# Patient Record
Sex: FEMALE | Race: BLACK OR AFRICAN AMERICAN | URBAN - METROPOLITAN AREA
[De-identification: names, ages, dates, MRNs, and addresses within clinical notes are randomized per-mention and may not be internally consistent; named-entity substitution may affect disease eponyms.]

---

## 2017-02-22 ENCOUNTER — OFFICE (AMBULATORY)
Dept: URBAN - METROPOLITAN AREA CLINIC 64 | Facility: CLINIC | Age: 47
End: 2017-02-22
Payer: COMMERCIAL

## 2017-02-22 VITALS
HEART RATE: 54 BPM | SYSTOLIC BLOOD PRESSURE: 128 MMHG | HEIGHT: 64 IN | WEIGHT: 278 LBS | DIASTOLIC BLOOD PRESSURE: 100 MMHG

## 2017-02-22 DIAGNOSIS — D50.0 IRON DEFICIENCY ANEMIA SECONDARY TO BLOOD LOSS (CHRONIC): ICD-10-CM

## 2017-02-22 DIAGNOSIS — B16.9 ACUTE HEPATITIS B WITHOUT DELTA-AGENT AND WITHOUT HEPATIC CO: ICD-10-CM

## 2017-02-22 LAB
HBSAG SCREEN: POSITIVE
HBV CORE AB, IGG/IGM DIFF: HEP B CORE AB, IGM: NEGATIVE
HBV CORE AB, IGG/IGM DIFF: HEP B CORE AB, TOT: POSITIVE
HBV QUANT PCR RFX TO GENOTYPE: HBV GENOTYPE: (no result)
HBV QUANT PCR RFX TO GENOTYPE: HBV IU/ML: 300 IU/ML
HBV QUANT PCR RFX TO GENOTYPE: LOG10 HBV AS IU/ML: 2.48 LOG10IU/ML
HBV QUANT PCR RFX TO GENOTYPE: TEST INFORMATION: (no result)
HCV ANTIBODY: HEP C VIRUS AB: <0.1 S/CO RATIO
HEP A AB, IGM: NEGATIVE
HEP A AB, TOTAL: NEGATIVE
HEP BE AB: POSITIVE
HEP BE AG: NEGATIVE
HEPATITIS B SURF AB QUANT: <3.1 MIU/ML — LOW

## 2017-02-22 PROCEDURE — 99243 OFF/OP CNSLTJ NEW/EST LOW 30: CPT | Performed by: INTERNAL MEDICINE

## 2017-02-22 RX ORDER — FERROUS SULFATE TAB EC 325 MG (65 MG FE EQUIVALENT) 325 (65 FE) MG
325 TABLET DELAYED RESPONSE ORAL
Qty: 30 | Refills: 1 | Status: COMPLETED
Start: 2017-02-22 | End: 2017-10-18

## 2017-03-01 ENCOUNTER — HOSPITAL ENCOUNTER (OUTPATIENT)
Dept: LAB | Facility: HOSPITAL | Age: 47
Discharge: HOME | End: 2017-03-01
Attending: INTERNAL MEDICINE | Admitting: INTERNAL MEDICINE

## 2017-03-09 LAB — FIBROSPECT: NORMAL

## 2017-10-18 ENCOUNTER — OFFICE (AMBULATORY)
Dept: URBAN - METROPOLITAN AREA CLINIC 64 | Facility: CLINIC | Age: 47
End: 2017-10-18
Payer: COMMERCIAL

## 2017-10-18 VITALS
SYSTOLIC BLOOD PRESSURE: 106 MMHG | WEIGHT: 283 LBS | HEIGHT: 64 IN | HEART RATE: 91 BPM | DIASTOLIC BLOOD PRESSURE: 63 MMHG

## 2017-10-18 DIAGNOSIS — B18.1 CHRONIC VIRAL HEPATITIS B WITHOUT DELTA-AGENT: ICD-10-CM

## 2017-10-18 DIAGNOSIS — D50.0 IRON DEFICIENCY ANEMIA SECONDARY TO BLOOD LOSS (CHRONIC): ICD-10-CM

## 2017-10-18 PROCEDURE — 99212 OFFICE O/P EST SF 10 MIN: CPT | Performed by: INTERNAL MEDICINE

## 2018-05-02 ENCOUNTER — ON CAMPUS - OUTPATIENT (AMBULATORY)
Dept: URBAN - METROPOLITAN AREA HOSPITAL 77 | Facility: HOSPITAL | Age: 48
End: 2018-05-02

## 2018-05-02 DIAGNOSIS — K21.9 GASTRO-ESOPHAGEAL REFLUX DISEASE WITHOUT ESOPHAGITIS: ICD-10-CM

## 2018-05-02 DIAGNOSIS — F45.8 OTHER SOMATOFORM DISORDERS: ICD-10-CM

## 2018-05-02 DIAGNOSIS — R13.10 DYSPHAGIA, UNSPECIFIED: ICD-10-CM

## 2018-05-02 PROCEDURE — 43235 EGD DIAGNOSTIC BRUSH WASH: CPT | Performed by: INTERNAL MEDICINE

## 2018-05-02 PROCEDURE — 43450 DILATE ESOPHAGUS 1/MULT PASS: CPT | Performed by: INTERNAL MEDICINE

## 2020-04-10 ENCOUNTER — TELEHEALTH PROVIDED OTHER THAN IN PATIENT'S HOME (AMBULATORY)
Dept: URBAN - METROPOLITAN AREA TELEHEALTH 4 | Facility: TELEHEALTH | Age: 50
End: 2020-04-10
Payer: COMMERCIAL

## 2020-04-10 VITALS — HEIGHT: 64 IN

## 2020-04-10 DIAGNOSIS — D50.0 IRON DEFICIENCY ANEMIA SECONDARY TO BLOOD LOSS (CHRONIC): ICD-10-CM

## 2020-04-10 DIAGNOSIS — B18.1 CHRONIC VIRAL HEPATITIS B WITHOUT DELTA-AGENT: ICD-10-CM

## 2020-04-10 PROCEDURE — 99441 VIRTUAL CHECK-IN 5-10 MIN; COMMERCIAL INS: CPT | Performed by: INTERNAL MEDICINE

## 2020-04-10 NOTE — SERVICEHPINOTES
Today,   APRIL  LAZ   is a   49  female   participating in a virtual check-in via phone for follow-up evaluation of chronic hepatitis B.  We last saw her a couple of years ago for evaluation and treatment of chronic active hepatitis B in this setting of lung surgery for lung cancer and the need for chemotherapy.  She was treated with Baraclude prior to, during, and after hemotherapy in order to avoid a hepatitis B flare.  Today she reports that she is no longer taking Baraclude.  She reports that her CT scans have not shown any recurrent lung cancer thus far and she is no longer on chemotherapy.She does report that her liver enzymes temporarily went up which was the initial reason her primary care physician refer her back to GI.  Review of her records that I have show normal liver enzymes in October 2019.  She is not currently complaining of any nausea, jaundice, tea colored urine, right upper quadrant pain.  Right upper quadrant ultrasound from January 2020 showed no hepatic abnormality, gallstones, or biliary ductal dilation.  Review of her labs from October 2019 revealed hepatitis B surface antigen positivity consistent with chronic carrier state.  She has a negative surface antibody, a positive IgG core antibody, positive hepatitis B E antibody, and the last viral load I have revealed HBV DNA of 300 during the time she was receiving chemotherapy.  Her hemoglobin was 12.9 last October and her MCV is low at 72.  She has no menstrual periods reports no blood in the stool.  Colonoscopy 2014 was unremarkable.  I recommended oral iron supplementation.  She offers no other complaints.    .

## 2020-06-04 ENCOUNTER — TELEHEALTH PROVIDED OTHER THAN IN PATIENT'S HOME (AMBULATORY)
Dept: URBAN - METROPOLITAN AREA TELEHEALTH 4 | Facility: TELEHEALTH | Age: 50
End: 2020-06-04
Payer: COMMERCIAL

## 2020-06-04 VITALS — HEIGHT: 64 IN

## 2020-06-04 DIAGNOSIS — B18.1 CHRONIC VIRAL HEPATITIS B WITHOUT DELTA-AGENT: ICD-10-CM

## 2020-06-04 PROCEDURE — 99441 VIRTUAL CHECK-IN 5-10 MIN; COMMERCIAL INS: CPT | Performed by: INTERNAL MEDICINE

## 2020-06-04 NOTE — SERVICEHPINOTES
April presents today for telephonic follow-up evaluation of chronic active hepatitis B.  She had been treated with long-term Baraclude but reports today that she has been on for some time.  She historically is hepatitis B E antigen antibody positive and E antigen negative.  Her viral load has been extremely low and historically her ALT has been normal.  I discussed repeating her viral load, hepatitis B surface antigen status, and liver profile to determine whether or not she may remain off Baraclude or should restart.  We also performed an ultrasound of right upper quadrant January of this year revealing no evidence of cirrhosis or liver masses.  She was agreeable to proceed with alpha-fetoprotein testing and a Fibroscan as well.  She is not complaining of any other GI issue.  In 2018 she had dilation of Schatzki's ring to 50 English and is not having dysphagia.  Colonoscopy in 2014 was completely normal.

## 2022-07-11 ENCOUNTER — OFFICE (AMBULATORY)
Dept: URBAN - METROPOLITAN AREA CLINIC 64 | Facility: CLINIC | Age: 52
End: 2022-07-11
Payer: COMMERCIAL

## 2022-07-11 VITALS
HEART RATE: 90 BPM | DIASTOLIC BLOOD PRESSURE: 72 MMHG | WEIGHT: 292 LBS | HEIGHT: 64 IN | SYSTOLIC BLOOD PRESSURE: 128 MMHG

## 2022-07-11 DIAGNOSIS — D50.9 IRON DEFICIENCY ANEMIA, UNSPECIFIED: ICD-10-CM

## 2022-07-11 PROCEDURE — 99214 OFFICE O/P EST MOD 30 MIN: CPT

## 2022-07-11 RX ORDER — DICYCLOMINE HYDROCHLORIDE 10 MG/1
CAPSULE ORAL
Qty: 240 | Refills: 11 | Status: COMPLETED
Start: 2022-07-11 | End: 2022-12-01

## 2022-12-01 ENCOUNTER — OFFICE (AMBULATORY)
Dept: URBAN - METROPOLITAN AREA CLINIC 64 | Facility: CLINIC | Age: 52
End: 2022-12-01
Payer: COMMERCIAL

## 2022-12-01 VITALS
HEIGHT: 64 IN | HEART RATE: 94 BPM | SYSTOLIC BLOOD PRESSURE: 119 MMHG | WEIGHT: 283 LBS | DIASTOLIC BLOOD PRESSURE: 80 MMHG

## 2022-12-01 DIAGNOSIS — B18.1 CHRONIC VIRAL HEPATITIS B WITHOUT DELTA-AGENT: ICD-10-CM

## 2022-12-01 PROCEDURE — 99213 OFFICE O/P EST LOW 20 MIN: CPT | Performed by: INTERNAL MEDICINE

## 2023-11-01 ENCOUNTER — OFFICE (AMBULATORY)
Dept: URBAN - METROPOLITAN AREA CLINIC 64 | Facility: CLINIC | Age: 53
End: 2023-11-01
Payer: COMMERCIAL

## 2023-11-01 VITALS
HEIGHT: 64 IN | SYSTOLIC BLOOD PRESSURE: 101 MMHG | DIASTOLIC BLOOD PRESSURE: 71 MMHG | WEIGHT: 264 LBS | HEART RATE: 99 BPM

## 2023-11-01 DIAGNOSIS — B18.1 CHRONIC VIRAL HEPATITIS B WITHOUT DELTA-AGENT: ICD-10-CM

## 2023-11-01 PROCEDURE — 99214 OFFICE O/P EST MOD 30 MIN: CPT

## 2025-03-14 ENCOUNTER — ON CAMPUS - OUTPATIENT (AMBULATORY)
Dept: URBAN - METROPOLITAN AREA HOSPITAL 77 | Facility: HOSPITAL | Age: 55
End: 2025-03-14
Payer: COMMERCIAL

## 2025-03-14 DIAGNOSIS — K62.1 RECTAL POLYP: ICD-10-CM

## 2025-03-14 DIAGNOSIS — Z12.11 ENCOUNTER FOR SCREENING FOR MALIGNANT NEOPLASM OF COLON: ICD-10-CM

## 2025-03-14 DIAGNOSIS — K64.8 OTHER HEMORRHOIDS: ICD-10-CM

## 2025-03-14 DIAGNOSIS — K57.30 DIVERTICULOSIS OF LARGE INTESTINE WITHOUT PERFORATION OR ABS: ICD-10-CM

## 2025-03-14 PROCEDURE — 45385 COLONOSCOPY W/LESION REMOVAL: CPT | Performed by: INTERNAL MEDICINE
